# Patient Record
Sex: MALE | Race: WHITE | NOT HISPANIC OR LATINO | RURAL
[De-identification: names, ages, dates, MRNs, and addresses within clinical notes are randomized per-mention and may not be internally consistent; named-entity substitution may affect disease eponyms.]

---

## 2023-09-14 PROBLEM — 282825002: Status: ACTIVE | Noted: 2023-09-14

## 2023-09-14 PROBLEM — 123604002: Status: ACTIVE | Noted: 2023-09-14

## 2023-09-14 PROBLEM — 711150003: Status: ACTIVE | Noted: 2023-09-14

## 2023-09-14 PROBLEM — 85898001: Status: ACTIVE | Noted: 2023-09-14

## 2023-09-14 PROBLEM — 34742003: Status: ACTIVE | Noted: 2023-09-14

## 2023-09-14 PROBLEM — 15167005: Status: ACTIVE | Noted: 2023-09-14

## 2023-09-15 ENCOUNTER — OFFICE VISIT (OUTPATIENT)
Dept: RURAL CLINIC 8 | Facility: CLINIC | Age: 63
End: 2023-09-15

## 2023-09-15 RX ORDER — SPIRONOLACTONE 25 MG/1
1 TABLET TABLET, FILM COATED ORAL
Status: ACTIVE | COMMUNITY

## 2023-09-15 RX ORDER — ATORVASTATIN CALCIUM 40 MG/1
1 TABLET TABLET, FILM COATED ORAL ONCE A DAY
Status: ACTIVE | COMMUNITY

## 2023-09-15 RX ORDER — APIXABAN 2.5 MG/1
AS DIRECTED TABLET, FILM COATED ORAL
Status: ACTIVE | COMMUNITY

## 2023-09-15 RX ORDER — AMIODARONE HYDROCHLORIDE 100 MG/1
1 TABLET TABLET ORAL ONCE A DAY
Status: ACTIVE | COMMUNITY

## 2023-09-15 RX ORDER — CARVEDILOL 6.25 MG/1
1 TABLET WITH FOOD TABLET, FILM COATED ORAL TWICE A DAY
Status: ACTIVE | COMMUNITY

## 2023-09-15 RX ORDER — OMEPRAZOLE 20 MG/1
1 CAPSULE 30 MINUTES BEFORE MORNING MEAL CAPSULE, DELAYED RELEASE ORAL ONCE A DAY
Status: ACTIVE | COMMUNITY

## 2023-09-15 RX ORDER — TRAMADOL HYDROCHLORIDE 50 MG/1
1 TABLET AS NEEDED TABLET, FILM COATED ORAL ONCE A DAY
Status: ACTIVE | COMMUNITY

## 2023-09-15 RX ORDER — TRAZODONE HYDROCHLORIDE 50 MG/1
1 TABLET AT BEDTIME AS NEEDED TABLET ORAL ONCE A DAY
Status: ACTIVE | COMMUNITY

## 2023-09-15 RX ORDER — DULOXETINE 30 MG/1
1 CAPSULE CAPSULE, DELAYED RELEASE PELLETS ORAL ONCE A DAY
Status: ACTIVE | COMMUNITY

## 2023-09-15 NOTE — HPI-TODAY'S VISIT:
in July of 2023 [place] [facility] [place]  Patient had massive ascites on CT scan along with evidence of cirrhosis and portal hypertension.  Patient does endorse his have a history of alcohol consumption in the past.

## 2023-09-19 ENCOUNTER — OFFICE VISIT (OUTPATIENT)
Dept: URBAN - METROPOLITAN AREA CLINIC 54 | Facility: CLINIC | Age: 63
End: 2023-09-19

## 2023-10-04 ENCOUNTER — OFFICE VISIT (OUTPATIENT)
Dept: RURAL CLINIC 2 | Facility: CLINIC | Age: 63
End: 2023-10-04
Payer: COMMERCIAL

## 2023-10-04 ENCOUNTER — LAB OUTSIDE AN ENCOUNTER (OUTPATIENT)
Dept: RURAL CLINIC 2 | Facility: CLINIC | Age: 63
End: 2023-10-04

## 2023-10-04 VITALS
BODY MASS INDEX: 19.77 KG/M2 | HEIGHT: 72 IN | TEMPERATURE: 97.3 F | WEIGHT: 146 LBS | SYSTOLIC BLOOD PRESSURE: 90 MMHG | DIASTOLIC BLOOD PRESSURE: 65 MMHG | HEART RATE: 84 BPM

## 2023-10-04 DIAGNOSIS — Z87.898 HISTORY OF ASCITES: ICD-10-CM

## 2023-10-04 DIAGNOSIS — K76.6 PORTAL HYPERTENSION: ICD-10-CM

## 2023-10-04 DIAGNOSIS — K70.30 ALCOHOLIC CIRRHOSIS, UNSPECIFIED WHETHER ASCITES PRESENT: ICD-10-CM

## 2023-10-04 DIAGNOSIS — F10.10 ALCOHOL ABUSE: ICD-10-CM

## 2023-10-04 PROBLEM — 190606006: Status: ACTIVE | Noted: 2023-10-04

## 2023-10-04 PROBLEM — 420054005: Status: ACTIVE | Noted: 2023-10-04

## 2023-10-04 PROBLEM — 119247004: Status: ACTIVE | Noted: 2023-10-04

## 2023-10-04 PROBLEM — 110483000: Status: ACTIVE | Noted: 2023-10-04

## 2023-10-04 PROBLEM — 305058001: Status: ACTIVE | Noted: 2023-10-04

## 2023-10-04 PROBLEM — 312850006: Status: ACTIVE | Noted: 2023-10-04

## 2023-10-04 PROCEDURE — 99244 OFF/OP CNSLTJ NEW/EST MOD 40: CPT | Performed by: NURSE PRACTITIONER

## 2023-10-04 PROCEDURE — 99204 OFFICE O/P NEW MOD 45 MIN: CPT | Performed by: NURSE PRACTITIONER

## 2023-10-04 RX ORDER — SPIRONOLACTONE 25 MG/1
1 TABLET TABLET, FILM COATED ORAL
Status: ACTIVE | COMMUNITY

## 2023-10-04 RX ORDER — TRAMADOL HYDROCHLORIDE 50 MG/1
1 TABLET AS NEEDED TABLET, FILM COATED ORAL ONCE A DAY
Status: ACTIVE | COMMUNITY

## 2023-10-04 RX ORDER — ATORVASTATIN CALCIUM 40 MG/1
1 TABLET TABLET, FILM COATED ORAL ONCE A DAY
Status: ACTIVE | COMMUNITY

## 2023-10-04 RX ORDER — FUROSEMIDE 40 MG/1
1 TABLET TABLET ORAL
Status: ACTIVE | COMMUNITY

## 2023-10-04 RX ORDER — OMEPRAZOLE 20 MG/1
1 CAPSULE 30 MINUTES BEFORE MORNING MEAL CAPSULE, DELAYED RELEASE ORAL ONCE A DAY
Status: ACTIVE | COMMUNITY

## 2023-10-04 RX ORDER — CARVEDILOL 6.25 MG/1
1 TABLET WITH FOOD TABLET, FILM COATED ORAL TWICE A DAY
Status: ACTIVE | COMMUNITY

## 2023-10-04 RX ORDER — APIXABAN 2.5 MG/1
AS DIRECTED TABLET, FILM COATED ORAL
Status: ACTIVE | COMMUNITY

## 2023-10-04 RX ORDER — DULOXETINE 30 MG/1
1 CAPSULE CAPSULE, DELAYED RELEASE PELLETS ORAL ONCE A DAY
Status: ACTIVE | COMMUNITY

## 2023-10-04 RX ORDER — AMIODARONE HYDROCHLORIDE 100 MG/1
1 TABLET TABLET ORAL ONCE A DAY
Status: ACTIVE | COMMUNITY

## 2023-10-04 RX ORDER — TRAZODONE HYDROCHLORIDE 50 MG/1
1 TABLET AT BEDTIME AS NEEDED TABLET ORAL ONCE A DAY
Status: ACTIVE | COMMUNITY

## 2023-10-04 NOTE — HPI-TODAY'S VISIT:
in July of 2023 this patient was evaluated in the emergency room at [facility] [place].  Patient had massive ascites on CT scan along with evidence of cirrhosis and portal hypertension.  Patient does endorse his have a history of alcohol consumption in the past.

## 2023-10-04 NOTE — HPI-ZZZTODAY'S VISIT
10/04/2023 The patient is a 63-year-old gentleman who presents on referral from Dr. Karthikeyan Harmon for ascites.  A copy of this document to be sent to the referring provider.  The referral was sent a couple of months ago and during that period of time the patient was admitted to [facility] on September 22nd for weakness, fatigue, loss of appetite and frequent falls.  History of cirrhosis of the liver with ascites secondary to alcohol abuse. Other history included Atrial fibrillation, on anticoagulation therapy. During his hospitalization his lab work initially revealed leukocytosis with a white blood count of 18.5 and upon discharge trending downward to 14. Sodium level of 129, total bilirubin 1.38, alkaline phosphatase 225, AST 33, ALT 12, albumin level 1.5.  CT chest, abdomen and pelvis revealed bilateral lower lobe atelectasis, hepatic cirrhosis, Anasarca, he underwent a paracentesis with removal of 5 L.  Fluid was found to be negative for malignant cells. Lab work at discharge on 09/30 significantly improved with sodium level at 135, total bili 0.68, alkaline phosphatase 111, AST 21, ALT 12 and albumin level 2.1.  The patient reports a history of heavy alcohol use for over 20 years.  His last alcohol intake was in early July of this year. Since being discharged he reports Improvement in his appetite and fatigue level.  He is using a walker due to continued weakness.  He is currently on diuretic therapy with Lasix 40 mg and Aldactone 25 mg daily.  He continues with bilateral lower extremity edema with improvement.  He denies abdominal pain or abdominal distention.  No jaundice, nausea or vomiting.  He is tolerating a low sodium diet and drinking Ensure, 2 cans/day.  He continues to avoid alcohol.  The patient denies undergoing an upper endoscopy or colonoscopy in the past.

## 2023-11-07 PROBLEM — 420054005: Status: ACTIVE | Noted: 2023-11-07

## 2023-11-08 ENCOUNTER — LAB OUTSIDE AN ENCOUNTER (OUTPATIENT)
Dept: RURAL CLINIC 8 | Facility: CLINIC | Age: 63
End: 2023-11-08

## 2023-11-08 ENCOUNTER — OFFICE VISIT (OUTPATIENT)
Dept: RURAL CLINIC 8 | Facility: CLINIC | Age: 63
End: 2023-11-08
Payer: COMMERCIAL

## 2023-11-08 ENCOUNTER — DASHBOARD ENCOUNTERS (OUTPATIENT)
Age: 63
End: 2023-11-08

## 2023-11-08 VITALS
HEIGHT: 72 IN | TEMPERATURE: 96.8 F | HEART RATE: 78 BPM | SYSTOLIC BLOOD PRESSURE: 148 MMHG | WEIGHT: 125.8 LBS | DIASTOLIC BLOOD PRESSURE: 80 MMHG | BODY MASS INDEX: 17.04 KG/M2

## 2023-11-08 DIAGNOSIS — K76.6 PORTAL HYPERTENSION: ICD-10-CM

## 2023-11-08 DIAGNOSIS — G62.9 PERIPHERAL POLYNEUROPATHY: ICD-10-CM

## 2023-11-08 DIAGNOSIS — E46 PROTEIN MALNUTRITION: ICD-10-CM

## 2023-11-08 DIAGNOSIS — K70.31 ALCOHOLIC CIRRHOSIS OF LIVER WITH ASCITES: ICD-10-CM

## 2023-11-08 DIAGNOSIS — R53.81 PHYSICAL DECONDITIONING: ICD-10-CM

## 2023-11-08 DIAGNOSIS — Z79.01 ANTICOAGULANT LONG-TERM USE: ICD-10-CM

## 2023-11-08 DIAGNOSIS — I78.1 SPIDER ANGIOMA OF SKIN: ICD-10-CM

## 2023-11-08 DIAGNOSIS — Z72.0 TOBACCO ABUSE: ICD-10-CM

## 2023-11-08 DIAGNOSIS — I48.0 PAROXYSMAL A-FIB: ICD-10-CM

## 2023-11-08 DIAGNOSIS — F10.11 HISTORY OF ALCOHOL ABUSE: ICD-10-CM

## 2023-11-08 PROBLEM — 42345000: Status: ACTIVE | Noted: 2023-11-08

## 2023-11-08 PROBLEM — 371434005: Status: ACTIVE | Noted: 2023-11-08

## 2023-11-08 PROBLEM — 282825002: Status: ACTIVE | Noted: 2023-11-08

## 2023-11-08 PROBLEM — 238107002: Status: ACTIVE | Noted: 2023-11-08

## 2023-11-08 PROCEDURE — 99215 OFFICE O/P EST HI 40 MIN: CPT | Performed by: INTERNAL MEDICINE

## 2023-11-08 RX ORDER — CARVEDILOL 6.25 MG/1
1 TABLET WITH FOOD TABLET, FILM COATED ORAL TWICE A DAY
COMMUNITY

## 2023-11-08 RX ORDER — SPIRONOLACTONE 25 MG/1
1 TABLET TABLET, FILM COATED ORAL
COMMUNITY

## 2023-11-08 RX ORDER — OMEPRAZOLE 20 MG/1
1 CAPSULE 30 MINUTES BEFORE MORNING MEAL CAPSULE, DELAYED RELEASE ORAL ONCE A DAY
COMMUNITY

## 2023-11-08 RX ORDER — SODIUM, POTASSIUM,MAG SULFATES 17.5-3.13G
177 ML SOLUTION, RECONSTITUTED, ORAL ORAL
Qty: 1 KIT | Refills: 0 | OUTPATIENT
Start: 2023-11-08 | End: 2023-11-09

## 2023-11-08 RX ORDER — TRAZODONE HYDROCHLORIDE 50 MG/1
1 TABLET AT BEDTIME AS NEEDED TABLET ORAL ONCE A DAY
COMMUNITY

## 2023-11-08 RX ORDER — ATORVASTATIN CALCIUM 40 MG/1
1 TABLET TABLET, FILM COATED ORAL ONCE A DAY
COMMUNITY

## 2023-11-08 RX ORDER — AMIODARONE HYDROCHLORIDE 100 MG/1
1 TABLET TABLET ORAL ONCE A DAY
COMMUNITY

## 2023-11-08 RX ORDER — DULOXETINE 30 MG/1
1 CAPSULE CAPSULE, DELAYED RELEASE PELLETS ORAL ONCE A DAY
COMMUNITY

## 2023-11-08 RX ORDER — BISACODYL 5 MG
TAKE 4 TABLET, DELAYED RELEASE (ENTERIC COATED) ORAL
Qty: 4 | OUTPATIENT
Start: 2023-11-08 | End: 2023-11-09

## 2023-11-08 RX ORDER — TRAMADOL HYDROCHLORIDE 50 MG/1
1 TABLET AS NEEDED TABLET, FILM COATED ORAL ONCE A DAY
COMMUNITY

## 2023-11-08 RX ORDER — APIXABAN 2.5 MG/1
AS DIRECTED TABLET, FILM COATED ORAL
COMMUNITY

## 2023-11-08 NOTE — HPI-ZZZTODAY'S VISIT
10/04/2023 The patient is a 63-year-old gentleman who presents on referral from Dr. Karthikeyan Harmon for ascites.  A copy of this document to be sent to the referring provider.  The referral was sent a couple of months ago and during that period of time the patient was admitted to [facility] on September 22nd for weakness, fatigue, loss of appetite and frequent falls.  History of cirrhosis of the liver with ascites secondary to alcohol abuse. Other history included Atrial fibrillation, on anticoagulation therapy. During his hospitalization his lab work initially revealed leukocytosis with a white blood count of 18.5 and upon discharge trending downward to 14. Sodium level of 129, total bilirubin 1.38, alkaline phosphatase 225, AST 33, ALT 12, albumin level 1.5.  CT chest, abdomen and pelvis revealed bilateral lower lobe atelectasis, hepatic cirrhosis, Anasarca, he underwent a paracentesis with removal of 5 L.  Fluid was found to be negative for malignant cells. Lab work at discharge on 09/30 significantly improved with sodium level at 135, total bili 0.68, alkaline phosphatase 111, AST 21, ALT 12 and albumin level 2.1.  The patient reports a history of heavy alcohol use for over 20 years.  His last alcohol intake was in early July of this year. Since being discharged he reports Improvement in his appetite and fatigue level.  He is using a walker due to continued weakness.  He is currently on diuretic therapy with Lasix 40 mg and Aldactone 25 mg daily.  He continues with bilateral lower extremity edema with improvement.  He denies abdominal pain or abdominal distention.  No jaundice, nausea or vomiting.  He is tolerating a low sodium diet and drinking Ensure, 2 cans/day.  He continues to avoid alcohol.  The patient denies undergoing an upper endoscopy or colonoscopy in the past. -   11/08/2023: this man comes to the office today here in [place] for follow-up of decompensated alcoholic cirrhosis.  He is accompanied by his mother.  He tells me that he has not had any alcohol to drink in at least 3 months.  He continues to smoke cigarettes daily.  He has some cardiac history that he tells me he is on Eliquis for atrial fibrillation.  One of his medications before having a diagnosis of cirrhosis with spironolactone.  He says that he tries to drink boost 3 times a day he is trying to eat better and gain weight and have more healthy lifestyle.

## 2023-11-08 NOTE — HPI-TODAY'S VISIT:
in July of 2023 this patient was evaluated in the emergency room at [facility] [place].  Patient had massive ascites on CT scan along with evidence of cirrhosis and portal hypertension.  Patient does endorse his have a history of alcohol consumption in the past.

## 2023-11-13 ENCOUNTER — TELEPHONE ENCOUNTER (OUTPATIENT)
Dept: RURAL CLINIC 2 | Facility: CLINIC | Age: 63
End: 2023-11-13

## 2024-03-14 ENCOUNTER — COL/EGD (OUTPATIENT)
Dept: RURAL MEDICAL CENTER 4 | Facility: MEDICAL CENTER | Age: 64
End: 2024-03-14

## 2024-04-17 ENCOUNTER — COL/EGD (OUTPATIENT)
Dept: URBAN - METROPOLITAN AREA SURGERY CENTER 14 | Facility: SURGERY CENTER | Age: 64
End: 2024-04-17

## 2024-04-29 ENCOUNTER — COL/EGD (OUTPATIENT)
Dept: URBAN - METROPOLITAN AREA MEDICAL CENTER 33 | Facility: MEDICAL CENTER | Age: 64
End: 2024-04-29

## 2024-04-29 RX ORDER — SPIRONOLACTONE 25 MG/1
1 TABLET TABLET, FILM COATED ORAL
COMMUNITY

## 2024-04-29 RX ORDER — APIXABAN 2.5 MG/1
AS DIRECTED TABLET, FILM COATED ORAL
COMMUNITY

## 2024-04-29 RX ORDER — AMIODARONE HYDROCHLORIDE 100 MG/1
1 TABLET TABLET ORAL ONCE A DAY
COMMUNITY

## 2024-04-29 RX ORDER — TRAZODONE HYDROCHLORIDE 50 MG/1
1 TABLET AT BEDTIME AS NEEDED TABLET ORAL ONCE A DAY
COMMUNITY

## 2024-04-29 RX ORDER — DULOXETINE 30 MG/1
1 CAPSULE CAPSULE, DELAYED RELEASE PELLETS ORAL ONCE A DAY
COMMUNITY

## 2024-04-29 RX ORDER — OMEPRAZOLE 20 MG/1
1 CAPSULE 30 MINUTES BEFORE MORNING MEAL CAPSULE, DELAYED RELEASE ORAL ONCE A DAY
COMMUNITY

## 2024-04-29 RX ORDER — TRAMADOL HYDROCHLORIDE 50 MG/1
1 TABLET AS NEEDED TABLET, FILM COATED ORAL ONCE A DAY
COMMUNITY

## 2024-04-29 RX ORDER — CARVEDILOL 6.25 MG/1
1 TABLET WITH FOOD TABLET, FILM COATED ORAL TWICE A DAY
COMMUNITY

## 2024-04-29 RX ORDER — ATORVASTATIN CALCIUM 40 MG/1
1 TABLET TABLET, FILM COATED ORAL ONCE A DAY
COMMUNITY

## 2024-11-14 NOTE — PHYSICAL EXAM HENT:
Head, normocephalic, atraumatic, Face, Face within normal limits, Ears, External ears within normal limits, Nose/Nasopharynx, External nose normal appearance, nares patent, no nasal discharge, Mouth and Throat, Oral cavity appearance normal, Lips, Appearance normal [Visual inspection, sensory exam] : Foot exam, including visual inspection, sensory exam with mono filament, and pulse exam, was performed within the last 12 months

## 2025-03-12 ENCOUNTER — OFFICE VISIT (OUTPATIENT)
Dept: RURAL CLINIC 2 | Facility: CLINIC | Age: 65
End: 2025-03-12
Payer: COMMERCIAL

## 2025-03-12 ENCOUNTER — LAB OUTSIDE AN ENCOUNTER (OUTPATIENT)
Dept: RURAL CLINIC 2 | Facility: CLINIC | Age: 65
End: 2025-03-12

## 2025-03-12 VITALS
BODY MASS INDEX: 20.07 KG/M2 | HEIGHT: 72 IN | WEIGHT: 148.2 LBS | DIASTOLIC BLOOD PRESSURE: 89 MMHG | SYSTOLIC BLOOD PRESSURE: 129 MMHG | HEART RATE: 77 BPM | TEMPERATURE: 97.1 F

## 2025-03-12 DIAGNOSIS — G62.9 PERIPHERAL POLYNEUROPATHY: ICD-10-CM

## 2025-03-12 DIAGNOSIS — K76.6 PORTAL HYPERTENSION: ICD-10-CM

## 2025-03-12 DIAGNOSIS — Z79.01 ANTICOAGULANT LONG-TERM USE: ICD-10-CM

## 2025-03-12 DIAGNOSIS — Z86.0101 PERSONAL HISTORY OF ADENOMATOUS AND SERRATED COLON POLYPS: ICD-10-CM

## 2025-03-12 DIAGNOSIS — R53.81 PHYSICAL DECONDITIONING: ICD-10-CM

## 2025-03-12 DIAGNOSIS — I48.0 PAROXYSMAL A-FIB: ICD-10-CM

## 2025-03-12 DIAGNOSIS — Z72.0 TOBACCO ABUSE: ICD-10-CM

## 2025-03-12 DIAGNOSIS — I78.1 SPIDER ANGIOMA OF SKIN: ICD-10-CM

## 2025-03-12 DIAGNOSIS — F10.11 HISTORY OF ALCOHOL ABUSE: ICD-10-CM

## 2025-03-12 DIAGNOSIS — K70.31 ALCOHOLIC CIRRHOSIS OF LIVER WITH ASCITES: ICD-10-CM

## 2025-03-12 DIAGNOSIS — E46 PROTEIN MALNUTRITION: ICD-10-CM

## 2025-03-12 PROCEDURE — 99214 OFFICE O/P EST MOD 30 MIN: CPT | Performed by: NURSE PRACTITIONER

## 2025-03-12 RX ORDER — TRAMADOL HYDROCHLORIDE 50 MG/1
1 TABLET AS NEEDED TABLET, FILM COATED ORAL ONCE A DAY
Status: ACTIVE | COMMUNITY

## 2025-03-12 RX ORDER — ATORVASTATIN CALCIUM 40 MG/1
1 TABLET TABLET, FILM COATED ORAL ONCE A DAY
Status: ACTIVE | COMMUNITY

## 2025-03-12 RX ORDER — AMIODARONE HYDROCHLORIDE 100 MG/1
1 TABLET TABLET ORAL ONCE A DAY
Status: ACTIVE | COMMUNITY

## 2025-03-12 RX ORDER — SPIRONOLACTONE 25 MG/1
1 TABLET TABLET, FILM COATED ORAL
Status: ACTIVE | COMMUNITY

## 2025-03-12 RX ORDER — APIXABAN 2.5 MG/1
AS DIRECTED TABLET, FILM COATED ORAL
Status: ACTIVE | COMMUNITY

## 2025-03-12 RX ORDER — CARVEDILOL 6.25 MG/1
1 TABLET WITH FOOD TABLET, FILM COATED ORAL TWICE A DAY
Status: ACTIVE | COMMUNITY

## 2025-03-12 RX ORDER — DULOXETINE 30 MG/1
1 CAPSULE CAPSULE, DELAYED RELEASE PELLETS ORAL ONCE A DAY
Status: DISCONTINUED | COMMUNITY

## 2025-03-12 RX ORDER — OMEPRAZOLE 20 MG/1
1 CAPSULE 30 MINUTES BEFORE MORNING MEAL CAPSULE, DELAYED RELEASE ORAL ONCE A DAY
Status: DISCONTINUED | COMMUNITY

## 2025-03-12 RX ORDER — TRAZODONE HYDROCHLORIDE 50 MG/1
1 TABLET AT BEDTIME AS NEEDED TABLET ORAL ONCE A DAY
Status: ACTIVE | COMMUNITY

## 2025-03-12 NOTE — HPI-TODAY'S VISIT:
in July of 2023 this patient was evaluated in the emergency room at [facility] [place].  Patient had massive ascites on CT scan along with evidence of cirrhosis and portal hypertension.  Patient does endorse his have a history of alcohol consumption in the past.

## 2025-03-12 NOTE — HPI-ZZZTODAY'S VISIT
10/04/2023 The patient is a 63-year-old gentleman who presents on referral from Dr. Karthikeyan Harmon for ascites.  A copy of this document to be sent to the referring provider.  The referral was sent a couple of months ago and during that period of time the patient was admitted to [facility] on September 22nd for weakness, fatigue, loss of appetite and frequent falls.  History of cirrhosis of the liver with ascites secondary to alcohol abuse. Other history included Atrial fibrillation, on anticoagulation therapy. During his hospitalization his lab work initially revealed leukocytosis with a white blood count of 18.5 and upon discharge trending downward to 14. Sodium level of 129, total bilirubin 1.38, alkaline phosphatase 225, AST 33, ALT 12, albumin level 1.5.  CT chest, abdomen and pelvis revealed bilateral lower lobe atelectasis, hepatic cirrhosis, Anasarca, he underwent a paracentesis with removal of 5 L.  Fluid was found to be negative for malignant cells. Lab work at discharge on 09/30 significantly improved with sodium level at 135, total bili 0.68, alkaline phosphatase 111, AST 21, ALT 12 and albumin level 2.1.  The patient reports a history of heavy alcohol use for over 20 years.  His last alcohol intake was in early July of this year. Since being discharged he reports Improvement in his appetite and fatigue level.  He is using a walker due to continued weakness.  He is currently on diuretic therapy with Lasix 40 mg and Aldactone 25 mg daily.  He continues with bilateral lower extremity edema with improvement.  He denies abdominal pain or abdominal distention.  No jaundice, nausea or vomiting.  He is tolerating a low sodium diet and drinking Ensure, 2 cans/day.  He continues to avoid alcohol.  The patient denies undergoing an upper endoscopy or colonoscopy in the past. -   11/08/2023: this man comes to the office today here in [place] for follow-up of decompensated alcoholic cirrhosis.  He is accompanied by his mother.  He tells me that he has not had any alcohol to drink in at least 3 months.  He continues to smoke cigarettes daily.  He has some cardiac history that he tells me he is on Eliquis for atrial fibrillation.  One of his medications before having a diagnosis of cirrhosis with spironolactone.  He says that he tries to drink boost 3 times a day he is trying to eat better and gain weight and have more healthy lifestyle. 03/12/2025 The pt is f/u for cirrhosis complicated by elevated LFTs, portal hypertension and ascites. He hasn't been seen in 15 months. At his previous appointment a paracentesis was done for moderate ascites with removal of 4.5 liters of ascitic fluid. He hasn't had any further ascites since that time. He overall feels well. He said he is drinking 1-2 whisky and edmonds once a month. He said he is having significant dental work and has reached for a few sips of Whisky once the Novocaine wears off. No c/o LE swelling, jaundice, brain fog or easy beeding. Bidirectional endoscopy completed 4/24 reviewed. EGD negative for esopahgeal varices and a tubular adenoma found on colonoscopy.

## 2025-03-13 PROBLEM — 31031000119102: Status: ACTIVE | Noted: 2025-03-13

## 2025-03-13 PROBLEM — 428283002: Status: ACTIVE | Noted: 2025-03-13

## 2025-03-13 PROBLEM — 195382003: Status: ACTIVE | Noted: 2025-03-13

## 2025-08-11 ENCOUNTER — LAB OUTSIDE AN ENCOUNTER (OUTPATIENT)
Dept: RURAL CLINIC 2 | Facility: CLINIC | Age: 65
End: 2025-08-11

## 2025-08-11 ENCOUNTER — OFFICE VISIT (OUTPATIENT)
Dept: RURAL CLINIC 2 | Facility: CLINIC | Age: 65
End: 2025-08-11
Payer: COMMERCIAL

## 2025-08-11 DIAGNOSIS — I78.1 SPIDER ANGIOMA OF SKIN: ICD-10-CM

## 2025-08-11 DIAGNOSIS — Z72.0 TOBACCO ABUSE: ICD-10-CM

## 2025-08-11 DIAGNOSIS — R53.81 PHYSICAL DECONDITIONING: ICD-10-CM

## 2025-08-11 DIAGNOSIS — F10.11 HISTORY OF ALCOHOL ABUSE: ICD-10-CM

## 2025-08-11 DIAGNOSIS — Z79.01 ANTICOAGULANT LONG-TERM USE: ICD-10-CM

## 2025-08-11 DIAGNOSIS — G62.9 PERIPHERAL POLYNEUROPATHY: ICD-10-CM

## 2025-08-11 DIAGNOSIS — Z86.0101 PERSONAL HISTORY OF ADENOMATOUS AND SERRATED COLON POLYPS: ICD-10-CM

## 2025-08-11 DIAGNOSIS — K70.31 ALCOHOLIC CIRRHOSIS OF LIVER WITH ASCITES: ICD-10-CM

## 2025-08-11 DIAGNOSIS — I48.0 PAROXYSMAL A-FIB: ICD-10-CM

## 2025-08-11 DIAGNOSIS — K76.6 PORTAL HYPERTENSION: ICD-10-CM

## 2025-08-11 PROCEDURE — 99214 OFFICE O/P EST MOD 30 MIN: CPT | Performed by: NURSE PRACTITIONER

## 2025-08-11 RX ORDER — TRAMADOL HYDROCHLORIDE 50 MG/1
1 TABLET AS NEEDED TABLET, FILM COATED ORAL ONCE A DAY
Status: ACTIVE | COMMUNITY

## 2025-08-11 RX ORDER — SPIRONOLACTONE 25 MG/1
1 TABLET TABLET, FILM COATED ORAL
Status: ACTIVE | COMMUNITY

## 2025-08-11 RX ORDER — AMIODARONE HYDROCHLORIDE 100 MG/1
1 TABLET TABLET ORAL ONCE A DAY
Status: DISCONTINUED | COMMUNITY

## 2025-08-11 RX ORDER — TRAZODONE HYDROCHLORIDE 50 MG/1
1 TABLET AT BEDTIME AS NEEDED TABLET ORAL ONCE A DAY
Status: DISCONTINUED | COMMUNITY

## 2025-08-11 RX ORDER — CARVEDILOL 6.25 MG/1
1 TABLET WITH FOOD TABLET, FILM COATED ORAL TWICE A DAY
Status: ACTIVE | COMMUNITY

## 2025-08-11 RX ORDER — ATORVASTATIN CALCIUM 40 MG/1
1 TABLET TABLET, FILM COATED ORAL ONCE A DAY
Status: ACTIVE | COMMUNITY

## 2025-08-11 RX ORDER — APIXABAN 2.5 MG/1
AS DIRECTED TABLET, FILM COATED ORAL
Status: ACTIVE | COMMUNITY